# Patient Record
Sex: FEMALE | Race: WHITE | NOT HISPANIC OR LATINO | Employment: OTHER | URBAN - METROPOLITAN AREA
[De-identification: names, ages, dates, MRNs, and addresses within clinical notes are randomized per-mention and may not be internally consistent; named-entity substitution may affect disease eponyms.]

---

## 2023-03-21 LAB — SARS-COV-2 RESULT: NOT DETECTED

## 2023-03-22 ENCOUNTER — HOSPITAL ENCOUNTER (INPATIENT)
Dept: DATA CONVERSION | Facility: HOSPITAL | Age: 63
Discharge: HOME | End: 2023-03-24
Attending: THORACIC SURGERY (CARDIOTHORACIC VASCULAR SURGERY) | Admitting: THORACIC SURGERY (CARDIOTHORACIC VASCULAR SURGERY)
Payer: COMMERCIAL

## 2023-03-22 DIAGNOSIS — Z79.890 HORMONE REPLACEMENT THERAPY: ICD-10-CM

## 2023-03-22 DIAGNOSIS — Z87.891 PERSONAL HISTORY OF NICOTINE DEPENDENCE: ICD-10-CM

## 2023-03-22 DIAGNOSIS — E78.5 HYPERLIPIDEMIA, UNSPECIFIED: ICD-10-CM

## 2023-03-22 DIAGNOSIS — F32.A DEPRESSION, UNSPECIFIED: ICD-10-CM

## 2023-03-22 DIAGNOSIS — E03.9 HYPOTHYROIDISM, UNSPECIFIED: ICD-10-CM

## 2023-03-22 DIAGNOSIS — R91.1 SOLITARY PULMONARY NODULE: ICD-10-CM

## 2023-03-22 DIAGNOSIS — G89.4 CHRONIC PAIN SYNDROME: ICD-10-CM

## 2023-03-22 DIAGNOSIS — E87.6 HYPOKALEMIA: ICD-10-CM

## 2023-03-22 DIAGNOSIS — J98.11 ATELECTASIS: ICD-10-CM

## 2023-03-22 DIAGNOSIS — F41.9 ANXIETY DISORDER, UNSPECIFIED: ICD-10-CM

## 2023-03-22 DIAGNOSIS — C34.11 MALIGNANT NEOPLASM OF UPPER LOBE, RIGHT BRONCHUS OR LUNG (MULTI): ICD-10-CM

## 2023-03-22 DIAGNOSIS — Z98.1 ARTHRODESIS STATUS: ICD-10-CM

## 2023-03-22 DIAGNOSIS — E83.42 HYPOMAGNESEMIA: ICD-10-CM

## 2023-03-23 LAB
ACTIVATED PARTIAL THROMBOPLASTIN TIME IN PPP BY COAGULATION ASSAY: 26 SEC (ref 26–39)
ANION GAP IN SER/PLAS: 14 MMOL/L (ref 10–20)
CALCIUM (MG/DL) IN SER/PLAS: 8.8 MG/DL (ref 8.6–10.6)
CARBON DIOXIDE, TOTAL (MMOL/L) IN SER/PLAS: 24 MMOL/L (ref 21–32)
CHLORIDE (MMOL/L) IN SER/PLAS: 102 MMOL/L (ref 98–107)
CREATININE (MG/DL) IN SER/PLAS: 0.67 MG/DL (ref 0.5–1.05)
ERYTHROCYTE DISTRIBUTION WIDTH (RATIO) BY AUTOMATED COUNT: 12.5 % (ref 11.5–14.5)
ERYTHROCYTE DISTRIBUTION WIDTH (RATIO) BY AUTOMATED COUNT: NORMAL
ERYTHROCYTE MEAN CORPUSCULAR HEMOGLOBIN CONCENTRATION (G/DL) BY AUTOMATED: 31.6 G/DL (ref 32–36)
ERYTHROCYTE MEAN CORPUSCULAR HEMOGLOBIN CONCENTRATION (G/DL) BY AUTOMATED: NORMAL
ERYTHROCYTE MEAN CORPUSCULAR VOLUME (FL) BY AUTOMATED COUNT: 111 FL (ref 80–100)
ERYTHROCYTE MEAN CORPUSCULAR VOLUME (FL) BY AUTOMATED COUNT: NORMAL
ERYTHROCYTES (10*6/UL) IN BLOOD BY AUTOMATED COUNT: 3.87 X10E12/L (ref 4–5.2)
ERYTHROCYTES (10*6/UL) IN BLOOD BY AUTOMATED COUNT: NORMAL
GFR FEMALE: >90 ML/MIN/1.73M2
GLUCOSE (MG/DL) IN SER/PLAS: 150 MG/DL (ref 74–99)
HEMATOCRIT (%) IN BLOOD BY AUTOMATED COUNT: 43.1 % (ref 36–46)
HEMATOCRIT (%) IN BLOOD BY AUTOMATED COUNT: NORMAL
HEMOGLOBIN (G/DL) IN BLOOD: 13.6 G/DL (ref 12–16)
HEMOGLOBIN (G/DL) IN BLOOD: NORMAL
INR IN PPP BY COAGULATION ASSAY: 1 (ref 0.9–1.1)
LEUKOCYTES (10*3/UL) IN BLOOD BY AUTOMATED COUNT: 14.4 X10E9/L (ref 4.4–11.3)
LEUKOCYTES (10*3/UL) IN BLOOD BY AUTOMATED COUNT: NORMAL
MAGNESIUM (MG/DL) IN SER/PLAS: 2.26 MG/DL (ref 1.6–2.4)
NRBC (PER 100 WBCS) BY AUTOMATED COUNT: 0 /100 WBC (ref 0–0)
NRBC (PER 100 WBCS) BY AUTOMATED COUNT: NORMAL
PLATELETS (10*3/UL) IN BLOOD AUTOMATED COUNT: 302 X10E9/L (ref 150–450)
PLATELETS (10*3/UL) IN BLOOD AUTOMATED COUNT: NORMAL
POTASSIUM (MMOL/L) IN SER/PLAS: 4.5 MMOL/L (ref 3.5–5.3)
PROTHROMBIN TIME (PT) IN PPP BY COAGULATION ASSAY: 11.1 SEC (ref 9.8–13.4)
SODIUM (MMOL/L) IN SER/PLAS: 135 MMOL/L (ref 136–145)
UREA NITROGEN (MG/DL) IN SER/PLAS: 13 MG/DL (ref 6–23)

## 2023-03-24 LAB
ACTIVATED PARTIAL THROMBOPLASTIN TIME IN PPP BY COAGULATION ASSAY: 28 SEC (ref 26–39)
ANION GAP IN SER/PLAS: 13 MMOL/L (ref 10–20)
CALCIUM (MG/DL) IN SER/PLAS: 8.9 MG/DL (ref 8.6–10.6)
CARBON DIOXIDE, TOTAL (MMOL/L) IN SER/PLAS: 24 MMOL/L (ref 21–32)
CHLORIDE (MMOL/L) IN SER/PLAS: 100 MMOL/L (ref 98–107)
CREATININE (MG/DL) IN SER/PLAS: 0.62 MG/DL (ref 0.5–1.05)
ERYTHROCYTE DISTRIBUTION WIDTH (RATIO) BY AUTOMATED COUNT: 12.2 % (ref 11.5–14.5)
ERYTHROCYTE MEAN CORPUSCULAR HEMOGLOBIN CONCENTRATION (G/DL) BY AUTOMATED: 31.9 G/DL (ref 32–36)
ERYTHROCYTE MEAN CORPUSCULAR VOLUME (FL) BY AUTOMATED COUNT: 106 FL (ref 80–100)
ERYTHROCYTES (10*6/UL) IN BLOOD BY AUTOMATED COUNT: 3.62 X10E12/L (ref 4–5.2)
GFR FEMALE: >90 ML/MIN/1.73M2
GLUCOSE (MG/DL) IN SER/PLAS: 97 MG/DL (ref 74–99)
HEMATOCRIT (%) IN BLOOD BY AUTOMATED COUNT: 38.2 % (ref 36–46)
HEMOGLOBIN (G/DL) IN BLOOD: 12.2 G/DL (ref 12–16)
INR IN PPP BY COAGULATION ASSAY: 1 (ref 0.9–1.1)
LEUKOCYTES (10*3/UL) IN BLOOD BY AUTOMATED COUNT: 9.6 X10E9/L (ref 4.4–11.3)
MAGNESIUM (MG/DL) IN SER/PLAS: 2.35 MG/DL (ref 1.6–2.4)
NRBC (PER 100 WBCS) BY AUTOMATED COUNT: 0 /100 WBC (ref 0–0)
PLATELETS (10*3/UL) IN BLOOD AUTOMATED COUNT: 261 X10E9/L (ref 150–450)
POTASSIUM (MMOL/L) IN SER/PLAS: 4 MMOL/L (ref 3.5–5.3)
PROTHROMBIN TIME (PT) IN PPP BY COAGULATION ASSAY: 11.1 SEC (ref 9.8–13.4)
SODIUM (MMOL/L) IN SER/PLAS: 133 MMOL/L (ref 136–145)
UREA NITROGEN (MG/DL) IN SER/PLAS: 20 MG/DL (ref 6–23)

## 2023-04-20 LAB
COMPLETE PATHOLOGY REPORT: NORMAL
CONVERTED CLINICAL DIAGNOSIS-HISTORY: NORMAL
CONVERTED FINAL DIAGNOSIS: NORMAL
CONVERTED FINAL REPORT PDF LINK TO COPY AND PASTE: NORMAL
CONVERTED GROSS DESCRIPTION: NORMAL
CONVERTED INTRAOPERATIVE DIAGNOSIS: NORMAL
CONVERTED SYNOPTIC DIAGNOSIS: NORMAL

## 2023-09-04 PROBLEM — E03.9 HYPOTHYROIDISM: Status: ACTIVE | Noted: 2018-10-02

## 2023-09-04 PROBLEM — D49.4 BLADDER TUMOR: Status: ACTIVE | Noted: 2021-07-15

## 2023-09-04 PROBLEM — R73.01 IMPAIRED FASTING GLUCOSE: Status: ACTIVE | Noted: 2021-07-13

## 2023-09-04 PROBLEM — K21.9 ACID REFLUX: Status: ACTIVE | Noted: 2018-10-02

## 2023-09-04 PROBLEM — E78.2 HYPERLIPEMIA, MIXED: Status: ACTIVE | Noted: 2018-10-02

## 2023-09-04 PROBLEM — F32.A DEPRESSIVE DISORDER: Status: ACTIVE | Noted: 2018-10-02

## 2023-09-04 PROBLEM — G62.9 NEUROPATHY: Status: ACTIVE | Noted: 2021-10-19

## 2023-09-04 PROBLEM — E55.9 VITAMIN D DEFICIENCY: Status: ACTIVE | Noted: 2021-10-19

## 2023-09-04 PROBLEM — E78.00 ELEVATED CHOLESTEROL: Status: ACTIVE | Noted: 2018-10-02

## 2023-09-04 PROBLEM — F41.9 ANXIETY: Status: ACTIVE | Noted: 2018-10-02

## 2023-09-04 PROBLEM — R73.01 ELEVATED FASTING BLOOD SUGAR: Status: ACTIVE | Noted: 2021-02-11

## 2023-09-04 PROBLEM — S12.9XXA CERVICAL SPINE FRACTURE (MULTI): Status: ACTIVE | Noted: 2022-07-27

## 2023-09-04 PROBLEM — E53.8 B12 DEFICIENCY: Status: ACTIVE | Noted: 2018-10-02

## 2023-09-04 PROBLEM — R79.89 ELEVATED LIVER FUNCTION TESTS: Status: ACTIVE | Noted: 2021-07-13

## 2023-09-04 PROBLEM — R91.8 LUNG NODULES: Status: ACTIVE | Noted: 2022-12-29

## 2023-09-04 RX ORDER — CHOLECALCIFEROL (VITAMIN D3) 1250 MCG
TABLET ORAL
COMMUNITY
Start: 2022-11-02 | End: 2023-10-27 | Stop reason: ALTCHOICE

## 2023-09-04 RX ORDER — ATORVASTATIN CALCIUM 20 MG/1
20 TABLET, FILM COATED ORAL
COMMUNITY
Start: 2022-12-22

## 2023-09-04 RX ORDER — FOLIC ACID 1 MG/1
1 TABLET ORAL DAILY
COMMUNITY
End: 2023-10-27 | Stop reason: ALTCHOICE

## 2023-09-04 RX ORDER — CHLORHEXIDINE GLUCONATE ORAL RINSE 1.2 MG/ML
15 SOLUTION DENTAL AS NEEDED
COMMUNITY
End: 2023-10-27 | Stop reason: ALTCHOICE

## 2023-09-04 RX ORDER — LANOLIN ALCOHOL/MO/W.PET/CERES
1 CREAM (GRAM) TOPICAL DAILY
COMMUNITY
End: 2023-10-27 | Stop reason: ALTCHOICE

## 2023-09-04 RX ORDER — FLUTICASONE PROPIONATE 50 MCG
1 SPRAY, SUSPENSION (ML) NASAL DAILY
COMMUNITY

## 2023-09-04 RX ORDER — CHLORHEXIDINE GLUCONATE 40 MG/ML
SOLUTION TOPICAL DAILY PRN
COMMUNITY
End: 2023-10-27 | Stop reason: ALTCHOICE

## 2023-09-04 RX ORDER — LEVOTHYROXINE SODIUM 50 UG/1
50 TABLET ORAL
COMMUNITY
Start: 2022-12-22

## 2023-09-04 RX ORDER — METHOCARBAMOL 500 MG/1
500 TABLET, FILM COATED ORAL EVERY 6 HOURS
COMMUNITY
End: 2023-10-27 | Stop reason: ALTCHOICE

## 2023-09-04 RX ORDER — DULOXETIN HYDROCHLORIDE 60 MG/1
1 CAPSULE, DELAYED RELEASE ORAL DAILY
COMMUNITY
End: 2023-10-27 | Stop reason: SDUPTHER

## 2023-09-04 RX ORDER — MULTIVITAMIN
TABLET ORAL
COMMUNITY
End: 2023-10-27

## 2023-09-04 RX ORDER — ATORVASTATIN CALCIUM 10 MG/1
1 TABLET, FILM COATED ORAL DAILY
COMMUNITY
End: 2023-10-27 | Stop reason: DRUGHIGH

## 2023-09-11 ENCOUNTER — HOSPITAL ENCOUNTER (OUTPATIENT)
Dept: DATA CONVERSION | Facility: HOSPITAL | Age: 63
End: 2023-09-11

## 2023-09-11 DIAGNOSIS — R91.1 SOLITARY PULMONARY NODULE: ICD-10-CM

## 2023-09-14 NOTE — PROGRESS NOTES
Service: Thoracic & Esophageal Surgery     Subjective Data:   MARIS REILLY is a 62 year old Female who is Hospital Day # 3 and POD #2 for Flexible bronchoscopy, right robotic assisted upper lobe wedge resection, completion upper lobectomy, mediastinal lymph  node dissection, and rib blocks.    Additional Information:    no acute events overnight  yesterday, flushed with low grade temp, Tylenol given, CIWA initiated. CIWA scores have been 0  RA, voiding, pain controlled on oral regimen  cough productive of blood tinged thick sputum   Continued apical space on am CXR.  CT with forceful tidaling, few bubble evacuation, then no leak. CT clamped at 0730        Objective Data:     Objective Information:      T   P  R  BP   MAP  SpO2   Value  35  67  18  134/72      93%  Date/Time 3/24 5:58 3/24 5:58 3/24 5:58 3/24 5:58    3/24 5:58  Range  (35C - 37.7C )  (64 - 71 )  (18 - 20 )  (109 - 134 )/ (62 - 72 )    (91% - 98% )  Highest temp of 37.7 C was recorded at 3/23 13:40      Pain reported at 3/24 7:48: 2 = Mild    ---- Intake and Output  -----  Mn/Dy/Year Time  Intake   Output  Net  Mar 24, 2023 6:00 am  0   50  -50  Mar 23, 2023 10:00 pm  120   66  54  Mar 23, 2023 2:00 pm  420   1000  -580    The Intake and Output Totals for the last 24 hours are:      Intake   Output  Net      540   1116  -576    Physical Exam by System:    Constitutional: Seen sitting up in bed. Well developed,  awake/alert/oriented x3, no distress, alert and cooperative   Eyes: clear sclera   ENMT: mucous membranes moist   Head/Neck: Neck supple, trachea midline   Respiratory/Thorax: Breathing comfortably on room  air. R CT to WS, forceful tidaling with evacuation of few bubbles, then no leak appreciated with subsequent valsalva.   Cardiovascular: HRR. NSR per tele.   Gastrointestinal: Abdomen soft, NT, ND.   Genitourinary: voiding per bathroom   Extremities: normal extremities, no cyanosis edema,  contusions or wounds, no clubbing  CHUNG x 4.  No LE edema.   Neurological: alert and oriented x3, grossly intact   Psychological: Appropriate mood and behavior   Skin: Warm and dry, no lesions, no rashes appreciated     Medication:    Medications:          Continuous Medications       --------------------------------    1. Sodium Chloride 0.9% Infusion:  1000  mL  IntraVenous  <Continuous>         Scheduled Medications       --------------------------------    1. Atorvastatin:  20  mg  Oral  Daily    2. Docusate:  100  mg  Oral  3 Times a Day    3. DULoxetine:  60  mg  Oral  Daily    4. Folic Acid:  1  mg  Oral  Daily    5. Heparin SubCutaneous:  5000  unit(s)  SubCutaneous  Every 8 Hours    6. Ketorolac Injectable:  15  mg  IntraVenous Push  Every 6 Hours    7. Levothyroxine:  50  microgram(s)  Oral  Daily    8. Metoprolol Tartrate:  12.5  mg  Oral  Every 8 Hours    9. Multivitamin with Minerals:  1  tablet(s)  Oral  Daily    10. Thiamine:  100  mg  Oral  Daily         PRN Medications       --------------------------------    1. Acetaminophen:  650  mg  Oral  Every 4 Hours    2. Albuterol 2.5 mg - Ipratropium 0.5 mg/ 3 mL Neb Soln:  3  mL  Inhalation  Every 6 Hours    3. diazePAM (VALIUM):  10  mg  Oral  Every 2 Hours    4. Naloxone Injectable:  0.2  mg  IntraVenous Push  Once    5. Ondansetron Injectable:  4  mg  IntraVenous Push  Every 6 Hours    6. oxyCODONE Immediate Release:  5  mg  Oral  Every 4 Hours    7. oxyCODONE Immediate Release:  10  mg  Oral  Every 4 Hours    8. Sodium Chloride 0.9% Injectable Flush:  10  mL  IntraVenous Flush  Every 8 Hours and as Needed        Recent Lab Results:    Results:    CBC: 3/24/2023 07:10              \     Hgb     /                              \     12.2       /  WBC  ----------------  Plt               9.6       ----------------    261              /     Hct     \                              /     38.2       \            RBC: 3.62 L    MCV: 106 H        Radiology Results:    Results:        Impression:    1.  Interval development of a small right apical pneumothorax  2. Partially resolving right middle lung opacity, postsurgical  atelectasis or hemorrhage.  3. Partially resolving left basilar atelectasis.  4. Medical devices and postsurgical changes as above.     Critical findings were communicated by phone to Dr. Hernandes by Dr. Narinder Jensen MD (resident) on 3/23/2023 at approximately  06:24 a.m..      Xray Chest 1 View [Mar 23 2023  8:08AM]      Impression:    1.  Status post right upper lobectomy with significantly decreased  lung volumes bilaterally. No pneumothorax.  2. Ill-defined patchy opacity within the right mid lung may represent  pulmonary contusion or less likely postsurgical atelectasis and/or  hemorrhage; attention on follow-up is recommended.  3. Left basilar opacity likely representing atelectasis.  4. Medical devices and postsurgical changes as above.      Xray Chest 1 View [Mar 23 2023  8:05AM]      Impression:     [Interval development of a right apical pneumothorax    Left basilar atelectasis.    Medical devices and postsurgical changes as above.] []     Critical findings were communicated by phone to [Dr. Hernandes] by Dr. Narinder Jensen MD (resident) on [3/23/2023] at approximately [06:24 a.m.].           UNSIGNED REPOR Xray Chest 1 View [Mar 23 2023  6:43AM]      Impression:     [Status post right upper lobectomy with significantly decreased lung volumes bilaterally.] No pneumothorax.     Ill-defined patchy opacity within the right mid lung may represent pulmonary contusion or less likely developing infectious process; attention on follow-up is recommended.    [Left basilar opacity likely representing atelectasis.]    Medical devices and postsurgical changes as above.      [ Xray Chest 1 View [Mar 22 2023  5:20PM]      Assessment and Plan:   Code Status:  ·  Code Status Full Code     Assessment:    Ms. Sandy Mosher is a 62 year old female status post R robotic assisted Upper lobectomy  on 3/22/2023. Satisfactory post op course.     Neurology: post operative pain, hx cervical and thoracic spine fusion, hx depression, hx ETOH daily 2-3 beer/wine beverages  -continue oral regimen of pain control with oxycodone and Tylenol   -Bowel regimen available for constipation secondary to pain medication   -Out of bed to the chair throughout the day  -Encourage ambulation as tolerated  - continue home duloxetine  - CIWA protocol     Cardiovascular:   -Continue telemetry  -Vital signs every four hours  -Continue metoprolol 25mg BID for post operative arrhythmia prophylaxis    -Replace electrolytes as needed for K >4, Mg >2    Pulmonology: post op atelectasis, chest tube management, hx RUL nodule, 30 ppy smoker--recently quit   -Encourage incentive spirometer use every hour  -Continue pulmonary hygiene  -Wean oxygen as tolerated   -CT clamped at 0730, CXR at 1130, +/- removal  -Obtain daily CXR  -Monitor and record chest tube drainage every shift    Gastrointestinal: hx hyperlipidemia   - Regular diet as tolerated  - Zofran available for nausea  - scheduled colace, PRN bowel regimen  - continue home atorvastatin     Genitourinary:   -Spontaneously voiding   -Continue to monitor daily electrolytes with routine BMPs   -Adequate urine output    Infectious Disease:   -Continue to trend daily temperatures and WBC count to monitor for signs of post-operative infection   -Monitor surgical incisions for signs of infection   -Perioperative antibiotics completed     Hematology:   -Monitor for signs of acute blood loss  -Trend CBCs     Endocrine: hx hypothyroidism   - home levothyroxine    DVT Prophylaxis:   -Continue subcutaneous heparin and SCDs, early ambulation    Disposition:  -Plan for discharge to home once stable from a med-surg standpoint, as early as today if CT can be removed after clamp trial.     Patient seen and examined by this provider. Plan of care discussed with Ulises Kim and Nadira Acosta,  CNP  Thoracic team pager [phone].    Attestation:   Note Completion:  Provider/Team Pager # [phone]         Electronic Signatures:  Natividad Acosta (APRN-CNP)  (Signed 24-Mar-2023 08:02)   Authored: Service, Subjective Data, Objective Data, Assessment  and Plan, Note Completion      Last Updated: 24-Mar-2023 08:02 by Natividad Acosta (APRN-CNP)

## 2023-09-14 NOTE — PROGRESS NOTES
Service: Thoracic & Esophageal Surgery     Subjective Data:   MARIS REILLY is a 62 year old Female who is Hospital Day # 2 and POD #1 for Flexible bronchoscopy, right robotic assisted upper lobe wedge resection, completion upper lobectomy, mediastinal lymph  node dissection, and rib blocks.    Additional Information:    no acute events overnight  voiding  weaned to room air  CT with forceful tidaling, few bubble evacuation, then no leak  apical space on am CXR     Objective Data:     Objective Information:      T   P  R  BP   MAP  SpO2   Value  36.9  68  18  109/62      92%  Date/Time 3/23 10:05 3/23 10:05 3/23 10:05 3/23 10:05    3/23 10:05  Range  (35.4C - 36.9C )  (64 - 74 )  (18 - 18 )  (109 - 131 )/ (62 - 87 )    (92% - 98% )   As of 22-Mar-2023 20:51:00, patient is on 2 L/min of oxygen via nasal cannula.  Highest temp of 36.9 C was recorded at 3/23 10:05      Pain reported at 3/23 10:50: 6 = Moderate    ---- Intake and Output  -----  Mn/Dy/Year Time  Intake   Output  Net  Mar 23, 2023 6:00 am  0   85  -85  Mar 22, 2023 10:00 pm  820   360  460    The Intake and Output Totals for the last 24 hours are:      Intake   Output  Net      820   445  375    Physical Exam by System     Constitutional: Seen sitting up in bed. Well developed,  awake/alert/oriented x3, no distress, alert and cooperative   Eyes: clear sclera   ENMT: mucous membranes moist   Head/Neck: Neck supple, trachea midline   Respiratory/Thorax: Breathing comfortably on room  air. R CT to WS, forceful tidaling with evacuation of few bubbles, then no leak appreciated with subsequent valsalva.   Cardiovascular: HRR. NSR per tele.   Gastrointestinal: Abdomen soft, NT, ND.   Genitourinary: voiding per bathroom   Extremities: normal extremities, no cyanosis edema,  contusions or wounds, no clubbing  CHUNG x 4. No LE edema.   Neurological: alert and oriented x3, grossly intact   Psychological: Appropriate mood and behavior   Skin: Warm and dry, no  lesions, no rashes appreciated     Medication     Medications:          Continuous Medications       --------------------------------    1. Sodium Chloride 0.9% Infusion:  1000  mL  IntraVenous  <Continuous>         Scheduled Medications       --------------------------------    1. Atorvastatin:  20  mg  Oral  Daily    2. Docusate:  100  mg  Oral  3 Times a Day    3. DULoxetine:  60  mg  Oral  Daily    4. Heparin SubCutaneous:  5000  unit(s)  SubCutaneous  Every 8 Hours    5. Levothyroxine:  50  microgram(s)  Oral  Daily    6. Metoprolol Tartrate:  12.5  mg  Oral  Every 8 Hours         PRN Medications       --------------------------------    1. Acetaminophen:  650  mg  Oral  Every 4 Hours    2. Albuterol 2.5 mg - Ipratropium 0.5 mg/ 3 mL Neb Soln:  3  mL  Inhalation  Every 6 Hours    3. Naloxone Injectable:  0.2  mg  IntraVenous Push  Once    4. Ondansetron Injectable:  4  mg  IntraVenous Push  Every 6 Hours    5. oxyCODONE Immediate Release:  5  mg  Oral  Every 4 Hours    6. oxyCODONE Immediate Release:  10  mg  Oral  Every 4 Hours    7. Sodium Chloride 0.9% Injectable Flush:  10  mL  IntraVenous Flush  Every 8 Hours and as Needed    8. Sore Throat Lozenge:  1  lozenge(s)  Oral  Every 2 Hours        Recent Lab Results     Results:    CBC: 3/23/2023 08:46              \     Hgb     /                              \     Canceled       /  WBC  ----------------  Plt               Canceled       ----------------    Canceled              /     Hct     \                              /     Canceled       \            RBC: Canceled     MCV: Canceled           BMP: 3/23/2023 07:26  NA+        Cl-     BUN  /                         135 L   102    13  /  --------------------------------  Glucose                ---------------------------  150 H    K+     HCO3-   Creat \                         4.5  24    0.67  \  Calcium : 8.8     Anion Gap : 14      Coagulation: 3/23/2023 07:26  PT  /                    11.1   /  -------<    INR          ----------<      1.0  PTT\                    26  \                       Radiology Results     Results:        Impression:    1. Interval development of a small right apical pneumothorax  2. Partially resolving right middle lung opacity, postsurgical  atelectasis or hemorrhage.  3. Partially resolving left basilar atelectasis.  4. Medical devices and postsurgical changes as above.     Critical findings were communicated by phone to Dr. Hernandes by Dr. Narinder Jensen MD (resident) on 3/23/2023 at approximately  06:24 a.m..      Xray Chest 1 View [Mar 23 2023  8:08AM]      Impression:    1.  Status post right upper lobectomy with significantly decreased  lung volumes bilaterally. No pneumothorax.  2. Ill-defined patchy opacity within the right mid lung may represent  pulmonary contusion or less likely postsurgical atelectasis and/or  hemorrhage; attention on follow-up is recommended.  3. Left basilar opacity likely representing atelectasis.  4. Medical devices and postsurgical changes as above.      Xray Chest 1 View [Mar 23 2023  8:05AM]      Impression:     [Interval development of a right apical pneumothorax    Left basilar atelectasis.    Medical devices and postsurgical changes as above.] []     Critical findings were communicated by phone to [Dr. Hernandes] by Dr. Narinder Jensen MD (resident) on [3/23/2023] at approximately [06:24 a.m.].           UNSIGNED REPOR Xray Chest 1 View [Mar 23 2023  6:43AM]      Impression:     [Status post right upper lobectomy with significantly decreased lung volumes bilaterally.] No pneumothorax.     Ill-defined patchy opacity within the right mid lung may represent pulmonary contusion or less likely developing infectious process; attention on follow-up is recommended.    [Left basilar opacity likely representing atelectasis.]    Medical devices and postsurgical changes as above.      [ Xray Chest 1 View [Mar 22 2023  5:20PM]      Assessment  and Plan:        Medical History:   Lung cancer: Entered Date: 22-Mar-2023 16:32    Code Status   ·  Code Status Full Code     Assessment:    Ms. Sandy Mosher is a 62 year old female status post R robotic assisted Upper lobectomy on 3/22/2023. Satisfactory post op course.     Neurology: post operative pain, hx cervical and thoracic spine fusion, hx depression  -Discontinue PCA pump  -Begin oral regimen of pain control with oxycodone and Tylenol   -Bowel regimen available for constipation secondary to pain medication   -Out of bed to the chair throughout the day  -Encourage ambulation as tolerated  - continue home duloxetine    Cardiovascular:   -Continue telemetry  -Vital signs every four hours  -Continue metoprolol 25mg BID for post operative arrhythmia prophylaxis   -Continue home regimen of    -Replace electrolytes as needed for K >4, Mg >2    Pulmonology: post op atelectasis, chest tube management, hx RUL nodule, 30 ppy smoker--recently quit   -Encourage incentive spirometer use every hour  -Continue pulmonary hygiene  -Wean oxygen as tolerated   -Maintain chest tube to WS  -Obtain daily CXR  -Monitor and record chest tube drainage every shift    Gastrointestinal: hx hyperlipidemia   - Regular diet as tolerated  - Zofran available for nausea  - scheduled colace, PRN bowel regimen  - continue home atorvastatin     Genitourinary:   -Removed pierre  -Spontaneously voiding   -Continue to monitor daily electrolytes with routine BMPs   -Adequate urine output    Infectious Disease:   -Continue to trend daily temperatures and WBC count to monitor for signs of post-operative infection   -Monitor surgical incisions for signs of infection   -Perioperative antibiotics completed     Hematology:   -Monitor for signs of acute blood loss  -Trend CBCs     Endocrine: hx hypothyroidism   - home levothyroxine    DVT Prophylaxis:   -Continue subcutaneous heparin and SCDs, early ambulation    Disposition:  -Plan for discharge to home  once stable from a med-surg standpoint   -Assess for home needs     Patient seen and examined by this provider. Plan of care discussed with Dr. Julio Acosta CNP  Thoracic team pager [phone].    Attestation:   Note Completion   Provider/Team Pager # [phone]       Electronic Signatures for Addendum Section:   Natividad Acosta (APRN-CNP) (Signed Addendum 23-Mar-2023 14:23)   Febrile to 38 and flushed, tylenol administered  patient admits to drinking 2-3 beer or wine drinks daily  last drink days before surgery  CIWA protocol ordered in EMR     Electronic Signatures:  Natividad Acosta (APRN-CNP)  (Signed 23-Mar-2023 11:17)   Authored: Service, Subjective Data, Objective Data, Assessment  and Plan, Note Completion      Last Updated: 23-Mar-2023 14:23 by Natividad Acosta (APRN-CNP)

## 2023-09-14 NOTE — DISCHARGE SUMMARY
Send Summary:   Discharge Summary Providers:  Provider Role Provider Name   · Attending Sahil Galindo   · Nurse  Practitioner Real Cage   · Primary Adwoa Miranda       Note Recipients: BrigetteAdwoa olivera, [facility] - [phone]  []       Discharge:    Summary:   Admission Date: .22-Mar-2023 09:44:00   Discharge Date: 24-Mar-2023   Attending Physician at Discharge: Sahil Galindo   Admission Reason: lung cancer   Final Discharge Diagnoses: Lung cancer   Procedures: Date: 22-Mar-2023 16:47:00  Procedure Name: Flexible bronchoscopy, right robotic assisted upper lobe wedge resection, completion upper lobectomy, mediastinal lymph node dissection, and rib blocks   Condition at Discharge: Satisfactory   Disposition at Discharge: .Home   Vital Signs:        T   P  R  BP   MAP  SpO2   Value  36.5  69  16  146/79   102  96%  Date/Time 3/24 10:00 3/24 10:00 3/24 10:00 3/24 10:00  3/24 10:00 3/24 10:00  Range  (35C - 37.7C )  (64 - 71 )  (16 - 20 )  (109 - 146 )/ (62 - 79 )  (102 - 102 )  (91% - 98% )   As of 24-Mar-2023 10:00:00, patient is on 2 L/min of oxygen via nasal cannula.  Highest temp of 37.7 C was recorded at 3/23 13:40    Date:            Weight/Scale Type:  Height:   24-Mar-2023 02:55  68.3  kg              Hospital Course:    Ms. Sandy Mosher is a 62 year old female status post R robotic assisted Upper lobectomy on 3/22/2023. Satisfactory post op course.  Chest tube successfully removed  on 3/24/23 after a negative clamp trial with stable post removal CXR.  Supplemental oxygen was weaned off & was oxygenating 92% on room air the day of discharge. Pain was well managed with oral pain regimen. She was deemed stable for discharge on 3/24/23 and was given the usual thoracic discharge instructions. She was advised  to f/u with Dr. Kim in 2 weeks with CXR.     Hospital day of discharge management - spent >30 minutes coordinating the discharge and counseling/educating patient and family  regarding discharge instructions.        Discharge Information:    and Continuing Care:   Lab Results - Pending:    Surgical Pathology Drawn at 22-Mar-2023 15:54:00  Surgical Pathology Drawn at 22-Mar-2023 14:49:00  Surgical Pathology Drawn at 22-Mar-2023 14:30:00  Radiology Results - Pending: Xray Chest 1 View at  24-Mar-2023 13:05:00  Xray Chest 1 View at 24-Mar-2023 11:23:00  Xray Chest 1 View at 24-Mar-2023 04:19:00   Discharge Instructions:    Activity:           Activity as tolerated          Continue increasing activity and using incentive spirometer, cough and deep breathe          No flying for 3 weeks          No jogging or heavy aerobics for 3 weeks          No driving while on pain medications (narcotics)          No pushing, pulling or lifting objects over 15 lbs for 3 weeks    Nutrition/Diet:           Regular    Wound Care:           Maintain occlusive dressing intact on chest tube sites for 48 hours after last chest tube removal, then remove. Apply dry band-aid if any oozing          May shower 48 hours after last chest tube removal          No swimming or bath tub until incisions well healed, 1 week          Cleanse incisions with soap and water daily. No dressing required; leave open to air. Do not use lotions, creams or tub soaks          Notify thoracic surgery of redness, increased drainage, bleeding or other problems    Infectious Disease:           PPD Status:   not given          MRSA:   no          VRE:   no          C. Diff:   no          Other Resistant Organism:   no          Isolation Type:   none    Follow Up Appointments:    Follow-Up - Chest Xray:           Physician/Dept/Service:   Chest Xray          Scheduled Date/Time:   11-Apr-2023 13:00          Location:   [place] [address] Please check-in at the [facility] ..    Follow-Up - Primary Care Physician:           Physician/Dept/Service:   Primary Care Physician    Follow-Up  Appointment:           Physician/Dept/Service:   Thoracic Surgery/          Scheduled Date/Time:   11-Apr-2023 13:45          Location:   [place] [address] Please check-in at the [facility] ..          Phone Number:   [phone].    Discharge Medications: Home Medication   levothyroxine 50 mcg (0.05 mg) oral tablet - 1 tab(s) orally once a day  cyanocobalamin 500 mcg oral tablet - 1 tab(s) orally once a day  cholecalciferol 25 mcg (1000 intl units) oral tablet - 1 tab(s) orally once a day  atorvastatin 20 mg oral tablet - 1 tab(s) oral once a day  DULoxetine 60 mg oral delayed release capsule - 1 tab(s) oral once a day     PRN Medication   fluticasone 50 mcg/inh nasal spray - 1 spray(s) nasal once a day, As Needed  acetaminophen 325 mg oral tablet - 2 tab(s) orally every 4 hours, As needed, Pain - Mild (1-3) or fever  oxyCODONE 5 mg oral tablet - 1 tab(s) orally every 6 hours, As Needed -Pain     DNR Status:   ·  Code Status Code Status order at time of discharge: Full Code     Attestation:   Note Completion:  Provider/Team Pager # [phone]         Electronic Signatures:  Kemi Ha (APRN-CNP)  (Signed 24-Mar-2023 14:17)   Authored: Send Summary, Summary Content, Ongoing Care,  DNR Status, Note Completion      Last Updated: 24-Mar-2023 14:17 by Kemi Ha (APRN-CNP)

## 2023-10-02 NOTE — OP NOTE
Post Operative Note:     PreOp Diagnosis: Lung Nodule   Post-Procedure Diagnosis: Lung Cancer   Procedure: Flexible bronchoscopy, right robotic assisted  upper lobe wedge resection, completion upper lobectomy, mediastinal lymph node dissection, and rib blocks   Surgeon: Dr. Kim   Resident/Fellow/Other Assistant: Jania Redmond PA-C, Ollie Quan MD   Anesthesia: GETA   I.V. Fluids: 700cc crystalloids   Estimated Blood Loss (mL): 50cc   Specimen: yes. Right upper lobe wedge, completion  lobectomy. Mediastinal lymph nodes 4R, 7, 8, 10R   Findings: Frozen pathology of the right upper lung  wedge resection consistent with adenocarcinoma   Patient Returned To/Condition: PACU in stable condition   Drains and/or Catheters: One right chest tube to  -20cm suction     Operative Report Dictated:  Dictation: not applicable - note contains Operative  Report   Operative Report:    After identifying the patient the preoperative area, the patient is brought the operative room.  Patient placed in supine position.  Timeout performed.  General anesthesia  induced with a double-lumen tube.  Flexible bronchoscopy done to assess position of the endotracheal tube and to assess airway which was normal.  Patient placed on the left lateral decubitus with the right side up.  All bony prominences were padded.   Chest was prepped and draped in surgical fashion.  1 cm incision was performed 7 the costal space with mid axillary line.  After getting access to the chest cavity, multilevel rib blocks were done with a mixture of Marcaine under direct visualization.   Additional ports were placed 1 anterior and 2 posterior.  An assist port was placed between the ports 2 and 3.  This point we proceeded to docked the robot.  I proceeded to scrub out and moved to the console.  We started by doing a wedge resection of  the upper lobe nodule with a combination of black loads of the robotic stapler.  This was sent for frozen section and came back  as positive for adenocarcinoma.  So at this point attention was turned to the inferior pulmonary ligament where station 9 lymph  nodes were harvested and sent for permanent section.  This dissection was taken posteriorly into station 7 lymph nodes were harvested and sent for permanent section.  Station 4 lymph nodes were harvested and sent for permanent section.  As well as some  hilar lymph nodes.  Attention was turned to the superior vein which was circumferentially dissected and transected with a white load of the robotic stapler.  The pulmonary artery branch going to the upper lobe was identified circumferentially dissected  and transected with a white load of the robotic stapler.  Finally the bronchus was identified and circumferentially dissected and resected with a black load of the robotic stapler.  Finally showing combination of black loads we proceeded to transect the  rest of the upper lobe to complete the lobectomy.  This was put in the Endo Catch bag and removed and sent for permanent section.  Hemostasis was achieved at all times.  28 Khmer chest tube was placed under direct visualization robotic was undocked.   Lung was insufflated under regularization wounds were closed in layers.  Patient tolerated procedure very well was extubated and transferred to the postanesthesia care unit adequate conditions.  All counts were correct.  Jania Myers PA-C acted as  first assistant in this case.      Attestation:   Note Completion:  Provider/Team Pager # [phone]   I am a: Advanced Practice Provider   Attending Only - Shared Visit with Advanced Practice Provider This is a shared visit.  I have reviewed the Advanced Practice Provider?s encounter note, approve the Advanced Practice Provider?s documentation,  and provide the following additional information from my personal encounter.    Attending Attestation I was present for the entire procedure    Comments/ Additional Findings    .          Electronic  Signatures:  Jania Redmond (PAC)  (Signed 22-Mar-2023 16:53)   Authored: Post Operative Note, Note Completion  Sahil Galindo)  (Signed 22-Mar-2023 20:29)   Authored: Post Operative Note, Note Completion   Co-Signer: Post Operative Note, Note Completion      Last Updated: 22-Mar-2023 20:29 by Sahil Galindo)

## 2023-10-05 ENCOUNTER — HOSPITAL ENCOUNTER (OUTPATIENT)
Dept: RADIOLOGY | Facility: HOSPITAL | Age: 63
Discharge: HOME | End: 2023-10-05
Payer: COMMERCIAL

## 2023-10-05 DIAGNOSIS — R91.1 SOLITARY PULMONARY NODULE: ICD-10-CM

## 2023-10-05 PROCEDURE — 71250 CT THORAX DX C-: CPT

## 2023-10-09 RX ORDER — ASPIRIN 325 MG
50000 TABLET, DELAYED RELEASE (ENTERIC COATED) ORAL
COMMUNITY
Start: 2022-11-07

## 2023-10-09 RX ORDER — OXYCODONE HYDROCHLORIDE 5 MG/1
5 TABLET ORAL
COMMUNITY
Start: 2023-03-24 | End: 2023-10-27 | Stop reason: ALTCHOICE

## 2023-10-09 RX ORDER — BUPROPION HYDROCHLORIDE 150 MG/1
150 TABLET, EXTENDED RELEASE ORAL 2 TIMES DAILY
COMMUNITY
Start: 2022-10-20 | End: 2023-10-27

## 2023-10-10 ENCOUNTER — OFFICE VISIT (OUTPATIENT)
Dept: CARDIAC SURGERY | Facility: CLINIC | Age: 63
End: 2023-10-10
Payer: COMMERCIAL

## 2023-10-10 VITALS
RESPIRATION RATE: 18 BRPM | OXYGEN SATURATION: 98 % | DIASTOLIC BLOOD PRESSURE: 80 MMHG | WEIGHT: 159 LBS | HEIGHT: 63 IN | HEART RATE: 90 BPM | SYSTOLIC BLOOD PRESSURE: 145 MMHG | BODY MASS INDEX: 28.17 KG/M2

## 2023-10-10 DIAGNOSIS — R91.1 LUNG NODULE: Primary | ICD-10-CM

## 2023-10-10 DIAGNOSIS — Z09 S/P LUNG SURGERY, FOLLOW-UP EXAM: Primary | ICD-10-CM

## 2023-10-10 PROCEDURE — 99215 OFFICE O/P EST HI 40 MIN: CPT | Performed by: THORACIC SURGERY (CARDIOTHORACIC VASCULAR SURGERY)

## 2023-10-10 ASSESSMENT — ENCOUNTER SYMPTOMS
ENDOCRINE NEGATIVE: 1
FEVER: 0
CONSTIPATION: 0
PSYCHIATRIC NEGATIVE: 1
LOSS OF SENSATION IN FEET: 0
STRIDOR: 0
NEUROLOGICAL NEGATIVE: 1
CHEST TIGHTNESS: 0
APPETITE CHANGE: 0
CHILLS: 0
CHOKING: 0
DIAPHORESIS: 0
DIARRHEA: 0
FATIGUE: 0
DEPRESSION: 0
HEMATOLOGIC/LYMPHATIC NEGATIVE: 1
COUGH: 0
ALLERGIC/IMMUNOLOGIC NEGATIVE: 1
OCCASIONAL FEELINGS OF UNSTEADINESS: 0
ABDOMINAL DISTENTION: 0
SHORTNESS OF BREATH: 0
UNEXPECTED WEIGHT CHANGE: 0
NAUSEA: 0
WHEEZING: 0
PALPITATIONS: 0
MUSCULOSKELETAL NEGATIVE: 1
EYES NEGATIVE: 1
VOMITING: 0
ABDOMINAL PAIN: 0

## 2023-10-10 ASSESSMENT — LIFESTYLE VARIABLES
AUDIT-C TOTAL SCORE: 3
AUDIT TOTAL SCORE: 3
SKIP TO QUESTIONS 9-10: 1
HOW OFTEN DO YOU HAVE SIX OR MORE DRINKS ON ONE OCCASION: NEVER
HAS A RELATIVE, FRIEND, DOCTOR, OR ANOTHER HEALTH PROFESSIONAL EXPRESSED CONCERN ABOUT YOUR DRINKING OR SUGGESTED YOU CUT DOWN: NO
HOW MANY STANDARD DRINKS CONTAINING ALCOHOL DO YOU HAVE ON A TYPICAL DAY: 1 OR 2
HOW OFTEN DO YOU HAVE A DRINK CONTAINING ALCOHOL: 2-3 TIMES A WEEK
HAVE YOU OR SOMEONE ELSE BEEN INJURED AS A RESULT OF YOUR DRINKING: NO

## 2023-10-10 ASSESSMENT — PATIENT HEALTH QUESTIONNAIRE - PHQ9
2. FEELING DOWN, DEPRESSED OR HOPELESS: NOT AT ALL
SUM OF ALL RESPONSES TO PHQ9 QUESTIONS 1 & 2: 0
1. LITTLE INTEREST OR PLEASURE IN DOING THINGS: NOT AT ALL

## 2023-10-10 NOTE — PROGRESS NOTES
Subjective   Patient ID: Sandy Mosher is a 62 y.o. female who presents for Follow-up (6 month follow up with CT).  HPI  60-year-old female who last March underwent a robotic assisted upper lobe wedge resection completion upper lobectomy and mediastinal lymphadenectomy for lung cancer.  She has recovered very well from it.  Today she is here for follow-up.  I have personally reviewed her CAT scan and there is no evidence of recurrence.  She has recovered very well with minimal discomfort in the right upper quadrant of the abdomen.  I will see again in 6 months with a CT of the chest.  Review of Systems   Constitutional:  Negative for appetite change, chills, diaphoresis, fatigue, fever and unexpected weight change.   HENT: Negative.     Eyes: Negative.    Respiratory:  Negative for cough, choking, chest tightness, shortness of breath, wheezing and stridor.    Cardiovascular:  Negative for chest pain, palpitations and leg swelling.   Gastrointestinal:  Negative for abdominal distention, abdominal pain, constipation, diarrhea, nausea and vomiting.   Endocrine: Negative.    Genitourinary: Negative.    Musculoskeletal: Negative.    Skin: Negative.    Allergic/Immunologic: Negative.    Neurological: Negative.    Hematological: Negative.    Psychiatric/Behavioral: Negative.     All other systems reviewed and are negative.      Objective   Physical Exam  Constitutional:       Appearance: Normal appearance.   HENT:      Head: Normocephalic and atraumatic.      Nose: Nose normal.      Mouth/Throat:      Mouth: Mucous membranes are moist.      Pharynx: Oropharynx is clear.   Eyes:      Extraocular Movements: Extraocular movements intact.      Conjunctiva/sclera: Conjunctivae normal.      Pupils: Pupils are equal, round, and reactive to light.   Cardiovascular:      Rate and Rhythm: Normal rate and regular rhythm.      Pulses: Normal pulses.      Heart sounds: Normal heart sounds.   Pulmonary:      Effort: Pulmonary effort is  normal. No respiratory distress.      Breath sounds: Normal breath sounds. No stridor. No wheezing, rhonchi or rales.   Chest:      Chest wall: No tenderness.   Abdominal:      General: Abdomen is flat. Bowel sounds are normal.      Palpations: Abdomen is soft.   Musculoskeletal:         General: Normal range of motion.      Cervical back: Normal range of motion and neck supple.   Skin:     General: Skin is warm and dry.      Capillary Refill: Capillary refill takes less than 2 seconds.   Neurological:      General: No focal deficit present.      Mental Status: She is alert and oriented to person, place, and time.         Assessment/Plan   Diagnoses and all orders for this visit:  Lung nodule  Follow-up in 6 months with a CT of the chest.    Sahil Kim MD  Thoracic and Esophageal Surgery

## 2023-10-27 ENCOUNTER — OFFICE VISIT (OUTPATIENT)
Dept: PRIMARY CARE | Facility: CLINIC | Age: 63
End: 2023-10-27
Payer: COMMERCIAL

## 2023-10-27 VITALS
HEIGHT: 63 IN | BODY MASS INDEX: 28.35 KG/M2 | DIASTOLIC BLOOD PRESSURE: 94 MMHG | HEART RATE: 96 BPM | WEIGHT: 160 LBS | SYSTOLIC BLOOD PRESSURE: 146 MMHG

## 2023-10-27 DIAGNOSIS — E78.2 HYPERLIPEMIA, MIXED: ICD-10-CM

## 2023-10-27 DIAGNOSIS — Z12.31 ENCOUNTER FOR SCREENING MAMMOGRAM FOR MALIGNANT NEOPLASM OF BREAST: ICD-10-CM

## 2023-10-27 DIAGNOSIS — R73.01 IMPAIRED FASTING GLUCOSE: ICD-10-CM

## 2023-10-27 DIAGNOSIS — F41.9 ANXIETY: ICD-10-CM

## 2023-10-27 DIAGNOSIS — Z78.0 POSTMENOPAUSAL STATUS: ICD-10-CM

## 2023-10-27 DIAGNOSIS — Z23 NEED FOR VIRAL IMMUNIZATION: ICD-10-CM

## 2023-10-27 DIAGNOSIS — Z00.00 GENERAL MEDICAL EXAM: Primary | ICD-10-CM

## 2023-10-27 DIAGNOSIS — R06.02 SHORTNESS OF BREATH: ICD-10-CM

## 2023-10-27 DIAGNOSIS — E03.9 ACQUIRED HYPOTHYROIDISM: ICD-10-CM

## 2023-10-27 DIAGNOSIS — Z01.419 CERVICAL SMEAR, AS PART OF ROUTINE GYNECOLOGICAL EXAMINATION: ICD-10-CM

## 2023-10-27 PROBLEM — Z98.1 ARTHRODESIS STATUS: Status: ACTIVE | Noted: 2022-06-26

## 2023-10-27 PROBLEM — Z91.81 HISTORY OF FALLING: Status: ACTIVE | Noted: 2022-06-26

## 2023-10-27 PROBLEM — E78.5 HYPERLIPIDEMIA, UNSPECIFIED: Status: ACTIVE | Noted: 2022-06-26

## 2023-10-27 PROCEDURE — 88175 CYTOPATH C/V AUTO FLUID REDO: CPT

## 2023-10-27 PROCEDURE — 93000 ELECTROCARDIOGRAM COMPLETE: CPT | Performed by: PHYSICIAN ASSISTANT

## 2023-10-27 PROCEDURE — 99396 PREV VISIT EST AGE 40-64: CPT | Performed by: PHYSICIAN ASSISTANT

## 2023-10-27 PROCEDURE — 90686 IIV4 VACC NO PRSV 0.5 ML IM: CPT | Performed by: PHYSICIAN ASSISTANT

## 2023-10-27 PROCEDURE — 90471 IMMUNIZATION ADMIN: CPT | Performed by: PHYSICIAN ASSISTANT

## 2023-10-27 RX ORDER — DULOXETIN HYDROCHLORIDE 60 MG/1
60 CAPSULE, DELAYED RELEASE ORAL DAILY
Qty: 30 CAPSULE | Refills: 1 | Status: SHIPPED | OUTPATIENT
Start: 2023-10-27 | End: 2024-04-09

## 2023-10-27 ASSESSMENT — PATIENT HEALTH QUESTIONNAIRE - PHQ9
1. LITTLE INTEREST OR PLEASURE IN DOING THINGS: NOT AT ALL
2. FEELING DOWN, DEPRESSED OR HOPELESS: NOT AT ALL
SUM OF ALL RESPONSES TO PHQ9 QUESTIONS 1 AND 2: 0

## 2023-10-27 ASSESSMENT — ENCOUNTER SYMPTOMS: NERVOUS/ANXIOUS: 1

## 2023-10-27 ASSESSMENT — PAIN SCALES - GENERAL: PAINLEVEL: 0-NO PAIN

## 2023-10-27 NOTE — PROGRESS NOTES
"Subjective   Patient ID: Sandy Mosher is a 62 y.o. female who presents for Annual Exam (EKG 2022./Colonoscopy 2018./DEXA due.) and Gynecologic Exam (Last pap 2018 normal & negative).    Hyperlipidemia  This is a chronic problem. The problem is uncontrolled. Exacerbating diseases include hypothyroidism. Associated symptoms include myalgias. Pertinent negatives include no chest pain or shortness of breath. She is currently on no antihyperlipidemic treatment. Compliance problems include adherence to diet and adherence to exercise.    Thyroid Problem  Presents for follow-up visit. Symptoms include anxiety and fatigue. Patient reports no constipation, diarrhea or palpitations. Her past medical history is significant for hyperlipidemia.        Review of Systems   Constitutional:  Positive for fatigue. Negative for activity change, appetite change and fever.   HENT:  Negative for hearing loss and nosebleeds.    Eyes:  Negative for pain.   Respiratory:  Negative for chest tightness and shortness of breath.    Cardiovascular:  Negative for chest pain, palpitations and leg swelling.   Gastrointestinal:  Negative for abdominal pain, constipation and diarrhea.   Genitourinary:  Negative for dysuria and hematuria.   Musculoskeletal:  Positive for arthralgias, back pain, gait problem and myalgias. Negative for joint swelling.   Skin:  Negative for color change.   Neurological:  Positive for numbness. Negative for speech difficulty, light-headedness and headaches.   Psychiatric/Behavioral:  Negative for behavioral problems. The patient is nervous/anxious.        Objective   BP (!) 146/94   Pulse 96   Ht 1.6 m (5' 3\")   Wt 72.6 kg (160 lb)   LMP  (LMP Unknown)   BMI 28.34 kg/m²     Physical Exam  HENT:      Head: Normocephalic.      Nose: Nose normal.      Mouth/Throat:      Mouth: Mucous membranes are moist.   Eyes:      Extraocular Movements: Extraocular movements intact.      Pupils: Pupils are equal, round, and reactive " to light.   Neck:      Vascular: No carotid bruit.   Cardiovascular:      Rate and Rhythm: Normal rate and regular rhythm.      Heart sounds: No murmur heard.  Pulmonary:      Effort: Pulmonary effort is normal.   Chest:   Breasts:     Right: Normal. No swelling, mass or tenderness.      Left: Normal. No swelling, mass or tenderness.   Abdominal:      General: Abdomen is flat.      Tenderness: There is no abdominal tenderness.   Genitourinary:     General: Normal vulva.      Labia:         Right: No rash.         Left: No rash.       Urethra: No prolapse or urethral pain.      Comments: Minimal atrophy.  Cervix is patent endocervical sample taken.  Bimanual exam uterus is midline no cervical motion tenderness or uterine Or adnexal tenderness noted.  Musculoskeletal:         General: No swelling.      Cervical back: Normal range of motion.      Right lower leg: No edema.      Left lower leg: No edema.   Skin:     General: Skin is warm.      Coloration: Skin is not jaundiced.   Neurological:      General: No focal deficit present.      Mental Status: She is alert.   Psychiatric:         Mood and Affect: Mood normal.         Thought Content: Thought content normal.         Assessment/Plan   Problem List Items Addressed This Visit             ICD-10-CM    Anxiety F41.9    Relevant Medications    DULoxetine (Cymbalta) 60 mg DR capsule    Hyperlipemia, mixed E78.2    Relevant Orders    Comprehensive Metabolic Panel    Lipid Panel    Hypothyroidism E03.9    Relevant Orders    TSH with reflex to Free T4 if abnormal    Impaired fasting glucose R73.01    Relevant Orders    Albumin , Urine Random    Urinalysis with Reflex Microscopic    Hemoglobin A1C     Other Visit Diagnoses         Codes    General medical exam    -  Primary Z00.00    Relevant Orders    Urinalysis with Reflex Microscopic    CBC and Auto Differential    Encounter for screening mammogram for malignant neoplasm of breast     Z12.31    Relevant Orders    BI  mammo bilateral screening tomosynthesis    Postmenopausal status     Z78.0    Relevant Orders    XR DEXA bone density axial skeleton w VFA    Need for viral immunization     Z23    Relevant Orders    Flu vaccine (IIV4) age 6 months and greater, preservative free (Completed)    Shortness of breath     R06.02    Relevant Orders    ECG 12 lead (Clinic Performed) (Completed)    Cervical smear, as part of routine gynecological examination     Z01.419    Relevant Orders    THINPREP PAP TEST

## 2023-10-29 ASSESSMENT — ENCOUNTER SYMPTOMS
HEMATURIA: 0
NUMBNESS: 1
FEVER: 0
ABDOMINAL PAIN: 0
CHEST TIGHTNESS: 0
JOINT SWELLING: 0
APPETITE CHANGE: 0
SPEECH DIFFICULTY: 0
BACK PAIN: 1
DYSURIA: 0
PALPITATIONS: 0
DIARRHEA: 0
ARTHRALGIAS: 1
MYALGIAS: 1
COLOR CHANGE: 0
ACTIVITY CHANGE: 0
FATIGUE: 1
EYE PAIN: 0
LIGHT-HEADEDNESS: 0
HEADACHES: 0
SHORTNESS OF BREATH: 0
CONSTIPATION: 0

## 2023-11-01 ENCOUNTER — LAB (OUTPATIENT)
Dept: LAB | Facility: LAB | Age: 63
End: 2023-11-01
Payer: COMMERCIAL

## 2023-11-01 DIAGNOSIS — R73.01 IMPAIRED FASTING GLUCOSE: ICD-10-CM

## 2023-11-01 DIAGNOSIS — E03.9 ACQUIRED HYPOTHYROIDISM: ICD-10-CM

## 2023-11-01 DIAGNOSIS — Z00.00 GENERAL MEDICAL EXAM: ICD-10-CM

## 2023-11-01 DIAGNOSIS — E78.2 HYPERLIPEMIA, MIXED: ICD-10-CM

## 2023-11-01 LAB
ALBUMIN SERPL-MCNC: 4.8 G/DL (ref 3.5–5)
ALP BLD-CCNC: 82 U/L (ref 35–125)
ALT SERPL-CCNC: 53 U/L (ref 5–40)
ANION GAP SERPL CALC-SCNC: 14 MMOL/L
AST SERPL-CCNC: 43 U/L (ref 5–40)
BASOPHILS # BLD AUTO: 0.04 X10*3/UL (ref 0–0.1)
BASOPHILS NFR BLD AUTO: 0.5 %
BILIRUB SERPL-MCNC: 0.6 MG/DL (ref 0.1–1.2)
BUN SERPL-MCNC: 7 MG/DL (ref 8–25)
CALCIUM SERPL-MCNC: 10 MG/DL (ref 8.5–10.4)
CHLORIDE SERPL-SCNC: 95 MMOL/L (ref 97–107)
CHOLEST SERPL-MCNC: 278 MG/DL (ref 133–200)
CHOLEST/HDLC SERPL: 5 {RATIO}
CO2 SERPL-SCNC: 26 MMOL/L (ref 24–31)
CREAT SERPL-MCNC: 0.6 MG/DL (ref 0.4–1.6)
EOSINOPHIL # BLD AUTO: 0.14 X10*3/UL (ref 0–0.7)
EOSINOPHIL NFR BLD AUTO: 1.8 %
ERYTHROCYTE [DISTWIDTH] IN BLOOD BY AUTOMATED COUNT: 11.9 % (ref 11.5–14.5)
EST. AVERAGE GLUCOSE BLD GHB EST-MCNC: 123 MG/DL
GFR SERPL CREATININE-BSD FRML MDRD: >90 ML/MIN/1.73M*2
GLUCOSE SERPL-MCNC: 126 MG/DL (ref 65–99)
HBA1C MFR BLD: 5.9 %
HCT VFR BLD AUTO: 47.2 % (ref 36–46)
HDLC SERPL-MCNC: 56 MG/DL
HGB BLD-MCNC: 16.3 G/DL (ref 12–16)
IMM GRANULOCYTES # BLD AUTO: 0.05 X10*3/UL (ref 0–0.7)
IMM GRANULOCYTES NFR BLD AUTO: 0.6 % (ref 0–0.9)
LDLC SERPL CALC-MCNC: 194 MG/DL (ref 65–130)
LYMPHOCYTES # BLD AUTO: 2.15 X10*3/UL (ref 1.2–4.8)
LYMPHOCYTES NFR BLD AUTO: 27.8 %
MCH RBC QN AUTO: 35.3 PG (ref 26–34)
MCHC RBC AUTO-ENTMCNC: 34.5 G/DL (ref 32–36)
MCV RBC AUTO: 102 FL (ref 80–100)
MONOCYTES # BLD AUTO: 0.61 X10*3/UL (ref 0.1–1)
MONOCYTES NFR BLD AUTO: 7.9 %
NEUTROPHILS # BLD AUTO: 4.75 X10*3/UL (ref 1.2–7.7)
NEUTROPHILS NFR BLD AUTO: 61.4 %
NRBC BLD-RTO: 0 /100 WBCS (ref 0–0)
PLATELET # BLD AUTO: 316 X10*3/UL (ref 150–450)
POTASSIUM SERPL-SCNC: 5.4 MMOL/L (ref 3.4–5.1)
PROT SERPL-MCNC: 7.5 G/DL (ref 5.9–7.9)
RBC # BLD AUTO: 4.62 X10*6/UL (ref 4–5.2)
SODIUM SERPL-SCNC: 135 MMOL/L (ref 133–145)
T4 FREE SERPL-MCNC: 1.2 NG/DL (ref 0.9–1.7)
TRIGL SERPL-MCNC: 138 MG/DL (ref 40–150)
TSH SERPL DL<=0.05 MIU/L-ACNC: 5.54 MIU/L (ref 0.27–4.2)
WBC # BLD AUTO: 7.7 X10*3/UL (ref 4.4–11.3)

## 2023-11-01 PROCEDURE — 83036 HEMOGLOBIN GLYCOSYLATED A1C: CPT

## 2023-11-01 PROCEDURE — 36415 COLL VENOUS BLD VENIPUNCTURE: CPT

## 2023-11-01 PROCEDURE — 80061 LIPID PANEL: CPT

## 2023-11-01 PROCEDURE — 84443 ASSAY THYROID STIM HORMONE: CPT

## 2023-11-01 PROCEDURE — 85025 COMPLETE CBC W/AUTO DIFF WBC: CPT

## 2023-11-01 PROCEDURE — 84439 ASSAY OF FREE THYROXINE: CPT

## 2023-11-01 PROCEDURE — 80053 COMPREHEN METABOLIC PANEL: CPT

## 2023-11-11 LAB
CYTOLOGY CMNT CVX/VAG CYTO-IMP: NORMAL
LAB AP HPV GENOTYPE QUESTION: YES
LAB AP HPV HR: NORMAL
LABORATORY COMMENT REPORT: NORMAL
MENSTRUAL HX REPORTED: NORMAL
PATH REPORT.TOTAL CANCER: NORMAL

## 2023-11-13 ENCOUNTER — PATIENT MESSAGE (OUTPATIENT)
Dept: PRIMARY CARE | Facility: CLINIC | Age: 63
End: 2023-11-13
Payer: COMMERCIAL

## 2023-11-27 ENCOUNTER — OFFICE VISIT (OUTPATIENT)
Dept: PRIMARY CARE | Facility: CLINIC | Age: 63
End: 2023-11-27
Payer: COMMERCIAL

## 2023-11-27 VITALS
WEIGHT: 161 LBS | SYSTOLIC BLOOD PRESSURE: 132 MMHG | DIASTOLIC BLOOD PRESSURE: 82 MMHG | OXYGEN SATURATION: 98 % | BODY MASS INDEX: 28.52 KG/M2 | HEART RATE: 99 BPM

## 2023-11-27 DIAGNOSIS — E53.8 B12 DEFICIENCY: Primary | ICD-10-CM

## 2023-11-27 DIAGNOSIS — R79.89 ELEVATED LIVER FUNCTION TESTS: ICD-10-CM

## 2023-11-27 DIAGNOSIS — E78.49 OTHER HYPERLIPIDEMIA: ICD-10-CM

## 2023-11-27 PROCEDURE — 99213 OFFICE O/P EST LOW 20 MIN: CPT | Performed by: PHYSICIAN ASSISTANT

## 2023-11-27 ASSESSMENT — ENCOUNTER SYMPTOMS
SHORTNESS OF BREATH: 0
WEAKNESS: 0
CHEST TIGHTNESS: 0
TREMORS: 1
LIGHT-HEADEDNESS: 0
PALPITATIONS: 0

## 2023-11-27 ASSESSMENT — PAIN SCALES - GENERAL: PAINLEVEL: 0-NO PAIN

## 2023-11-27 NOTE — PROGRESS NOTES
Subjective   Patient ID: Sandy Mosher is a 63 y.o. female who presents for Follow-up.    HPI   Discussed test results.  Thyroid was off a little but she had just restarted back on her medication a few days prior to her blood draw.  Potassium is up a little also.  LFTs were up but she admits she had had wine the day before.  Review of Systems   Respiratory:  Negative for chest tightness and shortness of breath.    Cardiovascular:  Negative for chest pain and palpitations.   Neurological:  Positive for tremors. Negative for weakness and light-headedness.       Objective   BP (!) 152/94   Pulse 99   Wt 73 kg (161 lb)   LMP  (LMP Unknown)   SpO2 98%   BMI 28.52 kg/m²     Physical Exam  Constitutional:       Appearance: Normal appearance.   Neurological:      General: No focal deficit present.      Mental Status: She is alert. Mental status is at baseline.   Psychiatric:         Mood and Affect: Mood normal.         Thought Content: Thought content normal.         Judgment: Judgment normal.         Assessment/Plan   Diagnoses and all orders for this visit:  B12 deficiency  -     Vitamin B12; Future  Elevated liver function tests  -     Comprehensive Metabolic Panel; Future  Other hyperlipidemia  Other orders  -     Follow Up In Primary Care - Established  Potassium was up also. Will recheck it.

## 2023-11-29 ENCOUNTER — ANCILLARY PROCEDURE (OUTPATIENT)
Dept: RADIOLOGY | Facility: CLINIC | Age: 63
End: 2023-11-29
Payer: COMMERCIAL

## 2023-11-29 VITALS — WEIGHT: 160 LBS | HEIGHT: 63 IN | BODY MASS INDEX: 28.35 KG/M2

## 2023-11-29 DIAGNOSIS — Z12.31 ENCOUNTER FOR SCREENING MAMMOGRAM FOR MALIGNANT NEOPLASM OF BREAST: ICD-10-CM

## 2023-11-29 PROCEDURE — 77063 BREAST TOMOSYNTHESIS BI: CPT

## 2024-01-10 ENCOUNTER — APPOINTMENT (OUTPATIENT)
Dept: RADIOLOGY | Facility: CLINIC | Age: 64
End: 2024-01-10
Payer: COMMERCIAL

## 2024-02-07 ENCOUNTER — APPOINTMENT (OUTPATIENT)
Dept: RADIOLOGY | Facility: CLINIC | Age: 64
End: 2024-02-07
Payer: COMMERCIAL

## 2024-03-25 ENCOUNTER — HOSPITAL ENCOUNTER (OUTPATIENT)
Dept: RADIOLOGY | Facility: CLINIC | Age: 64
Discharge: HOME | End: 2024-03-25
Payer: COMMERCIAL

## 2024-03-25 DIAGNOSIS — Z09 S/P LUNG SURGERY, FOLLOW-UP EXAM: ICD-10-CM

## 2024-03-25 PROCEDURE — 71250 CT THORAX DX C-: CPT

## 2024-03-25 PROCEDURE — 71250 CT THORAX DX C-: CPT | Performed by: RADIOLOGY

## 2024-04-09 ENCOUNTER — OFFICE VISIT (OUTPATIENT)
Dept: CARDIAC SURGERY | Facility: CLINIC | Age: 64
End: 2024-04-09
Payer: COMMERCIAL

## 2024-04-09 VITALS
OXYGEN SATURATION: 97 % | DIASTOLIC BLOOD PRESSURE: 88 MMHG | HEIGHT: 63 IN | RESPIRATION RATE: 18 BRPM | HEART RATE: 104 BPM | SYSTOLIC BLOOD PRESSURE: 144 MMHG | WEIGHT: 166 LBS | BODY MASS INDEX: 29.41 KG/M2

## 2024-04-09 DIAGNOSIS — Z09 S/P LUNG SURGERY, FOLLOW-UP EXAM: ICD-10-CM

## 2024-04-09 DIAGNOSIS — C34.11 MALIGNANT NEOPLASM OF UPPER LOBE OF RIGHT LUNG (MULTI): Primary | ICD-10-CM

## 2024-04-09 PROCEDURE — 1036F TOBACCO NON-USER: CPT | Performed by: THORACIC SURGERY (CARDIOTHORACIC VASCULAR SURGERY)

## 2024-04-09 PROCEDURE — 99215 OFFICE O/P EST HI 40 MIN: CPT | Performed by: THORACIC SURGERY (CARDIOTHORACIC VASCULAR SURGERY)

## 2024-04-09 ASSESSMENT — ENCOUNTER SYMPTOMS
PSYCHIATRIC NEGATIVE: 1
STRIDOR: 0
VOMITING: 0
FEVER: 0
HEMATOLOGIC/LYMPHATIC NEGATIVE: 1
ENDOCRINE NEGATIVE: 1
ABDOMINAL DISTENTION: 0
CHILLS: 0
APPETITE CHANGE: 0
MUSCULOSKELETAL NEGATIVE: 1
ALLERGIC/IMMUNOLOGIC NEGATIVE: 1
PALPITATIONS: 0
CONSTIPATION: 0
ABDOMINAL PAIN: 0
EYES NEGATIVE: 1
UNEXPECTED WEIGHT CHANGE: 0
NEUROLOGICAL NEGATIVE: 1
SHORTNESS OF BREATH: 0
FATIGUE: 0
DIARRHEA: 0
COUGH: 0
LOSS OF SENSATION IN FEET: 0
DEPRESSION: 0
CHOKING: 0
WHEEZING: 0
CHEST TIGHTNESS: 0
DIAPHORESIS: 0
OCCASIONAL FEELINGS OF UNSTEADINESS: 0
NAUSEA: 0

## 2024-04-09 ASSESSMENT — LIFESTYLE VARIABLES
HOW OFTEN DO YOU HAVE A DRINK CONTAINING ALCOHOL: NEVER
HOW OFTEN DO YOU HAVE SIX OR MORE DRINKS ON ONE OCCASION: NEVER
AUDIT-C TOTAL SCORE: 0
SKIP TO QUESTIONS 9-10: 1
HOW MANY STANDARD DRINKS CONTAINING ALCOHOL DO YOU HAVE ON A TYPICAL DAY: PATIENT DOES NOT DRINK

## 2024-04-09 ASSESSMENT — PAIN SCALES - GENERAL: PAINLEVEL: 0-NO PAIN

## 2024-04-09 NOTE — PROGRESS NOTES
Subjective   Patient ID: Sandy Mosher is a 63 y.o. female who presents for Follow-up (1 year w CT).  HPI  60-year-old female who last March underwent a robotic assisted upper lobe wedge resection completion upper lobectomy and mediastinal lymphadenectomy for lung cancer.  She has recovered very well from it.  Today she is here for follow-up.  I have personally reviewed her CAT scan and there is no evidence of recurrence.  She has recovered very well with minimal discomfort in the right upper quadrant of the abdomen.  I will see again in 6 months with a CT of the chest.    Update 4/9/2024  She has been doing well exercising more.  She quit smoking and she is very happy about it.  Her CT scan today shows no evidence of recurrence.  We discussed continue exercise and smoking cessation.  I will see her again in 6 months with a CT of the chest.    Review of Systems   Constitutional:  Negative for appetite change, chills, diaphoresis, fatigue, fever and unexpected weight change.   HENT: Negative.     Eyes: Negative.    Respiratory:  Negative for cough, choking, chest tightness, shortness of breath, wheezing and stridor.    Cardiovascular:  Negative for chest pain, palpitations and leg swelling.   Gastrointestinal:  Negative for abdominal distention, abdominal pain, constipation, diarrhea, nausea and vomiting.   Endocrine: Negative.    Genitourinary: Negative.    Musculoskeletal: Negative.    Skin: Negative.    Allergic/Immunologic: Negative.    Neurological: Negative.    Hematological: Negative.    Psychiatric/Behavioral: Negative.     All other systems reviewed and are negative.      Objective   Physical Exam  Constitutional:       Appearance: Normal appearance.   HENT:      Head: Normocephalic and atraumatic.      Nose: Nose normal.      Mouth/Throat:      Mouth: Mucous membranes are moist.      Pharynx: Oropharynx is clear.   Eyes:      Extraocular Movements: Extraocular movements intact.      Conjunctiva/sclera:  Conjunctivae normal.      Pupils: Pupils are equal, round, and reactive to light.   Cardiovascular:      Rate and Rhythm: Normal rate and regular rhythm.      Pulses: Normal pulses.      Heart sounds: Normal heart sounds.   Pulmonary:      Effort: Pulmonary effort is normal. No respiratory distress.      Breath sounds: Normal breath sounds. No stridor. No wheezing, rhonchi or rales.   Chest:      Chest wall: No tenderness.   Abdominal:      General: Abdomen is flat. Bowel sounds are normal.      Palpations: Abdomen is soft.   Musculoskeletal:         General: Normal range of motion.      Cervical back: Normal range of motion and neck supple.   Skin:     General: Skin is warm and dry.      Capillary Refill: Capillary refill takes less than 2 seconds.   Neurological:      General: No focal deficit present.      Mental Status: She is alert and oriented to person, place, and time.         Assessment/Plan   Diagnoses and all orders for this visit:  Malignant neoplasm of upper lobe of right lung (CMS/HCC)  Follow-up in 6 months with a CT of the chest.    Sahil Kim MD  Thoracic and Esophageal Surgery

## 2024-10-03 ENCOUNTER — HOSPITAL ENCOUNTER (OUTPATIENT)
Dept: RADIOLOGY | Facility: CLINIC | Age: 64
Discharge: HOME | End: 2024-10-03
Payer: COMMERCIAL

## 2024-10-03 DIAGNOSIS — Z09 S/P LUNG SURGERY, FOLLOW-UP EXAM: ICD-10-CM

## 2024-10-03 PROCEDURE — 71250 CT THORAX DX C-: CPT

## 2024-10-07 ENCOUNTER — OFFICE VISIT (OUTPATIENT)
Dept: CARDIAC SURGERY | Facility: CLINIC | Age: 64
End: 2024-10-07
Payer: COMMERCIAL

## 2024-10-07 VITALS
RESPIRATION RATE: 18 BRPM | WEIGHT: 169.38 LBS | OXYGEN SATURATION: 97 % | BODY MASS INDEX: 30.01 KG/M2 | DIASTOLIC BLOOD PRESSURE: 82 MMHG | HEIGHT: 63 IN | HEART RATE: 94 BPM | SYSTOLIC BLOOD PRESSURE: 151 MMHG

## 2024-10-07 DIAGNOSIS — C34.11 MALIGNANT NEOPLASM OF UPPER LOBE OF RIGHT LUNG (MULTI): Primary | ICD-10-CM

## 2024-10-07 DIAGNOSIS — Z98.890 HISTORY OF LUNG SURGERY: ICD-10-CM

## 2024-10-07 PROCEDURE — 1036F TOBACCO NON-USER: CPT | Performed by: THORACIC SURGERY (CARDIOTHORACIC VASCULAR SURGERY)

## 2024-10-07 PROCEDURE — 99215 OFFICE O/P EST HI 40 MIN: CPT | Performed by: THORACIC SURGERY (CARDIOTHORACIC VASCULAR SURGERY)

## 2024-10-07 PROCEDURE — 3008F BODY MASS INDEX DOCD: CPT | Performed by: THORACIC SURGERY (CARDIOTHORACIC VASCULAR SURGERY)

## 2024-10-07 ASSESSMENT — ENCOUNTER SYMPTOMS
CHOKING: 0
UNEXPECTED WEIGHT CHANGE: 0
FATIGUE: 0
ENDOCRINE NEGATIVE: 1
DEPRESSION: 0
ABDOMINAL DISTENTION: 0
COUGH: 0
HEMATOLOGIC/LYMPHATIC NEGATIVE: 1
LOSS OF SENSATION IN FEET: 0
OCCASIONAL FEELINGS OF UNSTEADINESS: 0
ALLERGIC/IMMUNOLOGIC NEGATIVE: 1
VOMITING: 0
SHORTNESS OF BREATH: 0
DIAPHORESIS: 0
PSYCHIATRIC NEGATIVE: 1
WHEEZING: 0
APPETITE CHANGE: 0
MUSCULOSKELETAL NEGATIVE: 1
ABDOMINAL PAIN: 0
CHEST TIGHTNESS: 0
NAUSEA: 0
FEVER: 0
EYES NEGATIVE: 1
PALPITATIONS: 0
CONSTIPATION: 0
NEUROLOGICAL NEGATIVE: 1
DIARRHEA: 0
STRIDOR: 0
CHILLS: 0

## 2024-10-07 ASSESSMENT — PAIN SCALES - GENERAL: PAINLEVEL: 0-NO PAIN

## 2024-10-07 ASSESSMENT — LIFESTYLE VARIABLES
HOW OFTEN DO YOU HAVE SIX OR MORE DRINKS ON ONE OCCASION: NEVER
HOW OFTEN DO YOU HAVE A DRINK CONTAINING ALCOHOL: 4 OR MORE TIMES A WEEK
SKIP TO QUESTIONS 9-10: 1
AUDIT-C TOTAL SCORE: 4
HOW MANY STANDARD DRINKS CONTAINING ALCOHOL DO YOU HAVE ON A TYPICAL DAY: 1 OR 2

## 2024-10-07 NOTE — PROGRESS NOTES
Subjective   Patient ID: Sandy Mosher is a 63 y.o. female who presents for Follow-up (1 yr w ct).  HPI  60-year-old female who last March underwent a robotic assisted upper lobe wedge resection completion upper lobectomy and mediastinal lymphadenectomy for lung cancer.  She has recovered very well from it.  Today she is here for follow-up.  I have personally reviewed her CAT scan and there is no evidence of recurrence.  She has recovered very well with minimal discomfort in the right upper quadrant of the abdomen.  I will see again in 6 months with a CT of the chest.    Update 4/9/2024  She has been doing well exercising more.  She quit smoking and she is very happy about it.  Her CT scan today shows no evidence of recurrence.  We discussed continue exercise and smoking cessation.  I will see her again in 6 months with a CT of the chest.    Update 10/7/2024  Not smoking.  Doing well.  No new issues in the last 6 months.  CT scan showing no evidence of recurrence.  Will see her again in 6 months with a CT of the chest.  We are almost at the 2-year xu.    Review of Systems   Constitutional:  Negative for appetite change, chills, diaphoresis, fatigue, fever and unexpected weight change.   HENT: Negative.     Eyes: Negative.    Respiratory:  Negative for cough, choking, chest tightness, shortness of breath, wheezing and stridor.    Cardiovascular:  Negative for chest pain, palpitations and leg swelling.   Gastrointestinal:  Negative for abdominal distention, abdominal pain, constipation, diarrhea, nausea and vomiting.   Endocrine: Negative.    Genitourinary: Negative.    Musculoskeletal: Negative.    Skin: Negative.    Allergic/Immunologic: Negative.    Neurological: Negative.    Hematological: Negative.    Psychiatric/Behavioral: Negative.     All other systems reviewed and are negative.      Objective   Physical Exam  Constitutional:       Appearance: Normal appearance.   HENT:      Head: Normocephalic and  atraumatic.      Nose: Nose normal.      Mouth/Throat:      Mouth: Mucous membranes are moist.      Pharynx: Oropharynx is clear.   Eyes:      Extraocular Movements: Extraocular movements intact.      Conjunctiva/sclera: Conjunctivae normal.      Pupils: Pupils are equal, round, and reactive to light.   Cardiovascular:      Rate and Rhythm: Normal rate and regular rhythm.      Pulses: Normal pulses.      Heart sounds: Normal heart sounds.   Pulmonary:      Effort: Pulmonary effort is normal. No respiratory distress.      Breath sounds: Normal breath sounds. No stridor. No wheezing, rhonchi or rales.   Chest:      Chest wall: No tenderness.   Abdominal:      General: Abdomen is flat. Bowel sounds are normal.      Palpations: Abdomen is soft.   Musculoskeletal:         General: Normal range of motion.      Cervical back: Normal range of motion and neck supple.   Skin:     General: Skin is warm and dry.      Capillary Refill: Capillary refill takes less than 2 seconds.   Neurological:      General: No focal deficit present.      Mental Status: She is alert and oriented to person, place, and time.         Assessment/Plan   Diagnoses and all orders for this visit:  Malignant neoplasm of upper lobe of right lung (Multi)    Follow-up in 6 months with a CT of the chest.    Sahil Kim MD  Thoracic and Esophageal Surgery

## 2025-01-13 ENCOUNTER — APPOINTMENT (OUTPATIENT)
Dept: PRIMARY CARE | Facility: CLINIC | Age: 65
End: 2025-01-13
Payer: COMMERCIAL

## 2025-01-13 VITALS
DIASTOLIC BLOOD PRESSURE: 84 MMHG | HEART RATE: 80 BPM | BODY MASS INDEX: 30.11 KG/M2 | SYSTOLIC BLOOD PRESSURE: 136 MMHG | WEIGHT: 170 LBS | OXYGEN SATURATION: 99 %

## 2025-01-13 DIAGNOSIS — E55.9 VITAMIN D DEFICIENCY: ICD-10-CM

## 2025-01-13 DIAGNOSIS — E03.9 ACQUIRED HYPOTHYROIDISM: ICD-10-CM

## 2025-01-13 DIAGNOSIS — R79.89 ELEVATED LIVER FUNCTION TESTS: ICD-10-CM

## 2025-01-13 DIAGNOSIS — E78.2 HYPERLIPEMIA, MIXED: ICD-10-CM

## 2025-01-13 DIAGNOSIS — Z78.0 POST-MENOPAUSAL: ICD-10-CM

## 2025-01-13 DIAGNOSIS — Z12.31 ENCOUNTER FOR SCREENING MAMMOGRAM FOR BREAST CANCER: ICD-10-CM

## 2025-01-13 DIAGNOSIS — R73.01 IMPAIRED FASTING GLUCOSE: ICD-10-CM

## 2025-01-13 DIAGNOSIS — E53.8 B12 DEFICIENCY: ICD-10-CM

## 2025-01-13 DIAGNOSIS — Z00.00 GENERAL MEDICAL EXAM: Primary | ICD-10-CM

## 2025-01-13 DIAGNOSIS — F41.9 ANXIETY: ICD-10-CM

## 2025-01-13 LAB
POC APPEARANCE, URINE: CLEAR
POC BILIRUBIN, URINE: NEGATIVE
POC BLOOD, URINE: NEGATIVE
POC COLOR, URINE: YELLOW
POC GLUCOSE, URINE: NEGATIVE MG/DL
POC KETONES, URINE: NEGATIVE MG/DL
POC LEUKOCYTES, URINE: NEGATIVE
POC NITRITE,URINE: NEGATIVE
POC PH, URINE: 5 PH
POC PROTEIN, URINE: NEGATIVE MG/DL
POC SPECIFIC GRAVITY, URINE: >=1.03
POC UROBILINOGEN, URINE: 0.2 EU/DL

## 2025-01-13 PROCEDURE — 81003 URINALYSIS AUTO W/O SCOPE: CPT | Performed by: PHYSICIAN ASSISTANT

## 2025-01-13 PROCEDURE — 99396 PREV VISIT EST AGE 40-64: CPT | Performed by: PHYSICIAN ASSISTANT

## 2025-01-13 PROCEDURE — 1036F TOBACCO NON-USER: CPT | Performed by: PHYSICIAN ASSISTANT

## 2025-01-13 RX ORDER — DULOXETIN HYDROCHLORIDE 20 MG/1
40 CAPSULE, DELAYED RELEASE ORAL DAILY
Qty: 60 CAPSULE | Refills: 5 | Status: SHIPPED | OUTPATIENT
Start: 2025-01-13 | End: 2025-07-12

## 2025-01-13 ASSESSMENT — PATIENT HEALTH QUESTIONNAIRE - PHQ9
2. FEELING DOWN, DEPRESSED OR HOPELESS: NOT AT ALL
1. LITTLE INTEREST OR PLEASURE IN DOING THINGS: NOT AT ALL
SUM OF ALL RESPONSES TO PHQ9 QUESTIONS 1 AND 2: 0

## 2025-01-13 ASSESSMENT — ENCOUNTER SYMPTOMS
PALPITATIONS: 0
ACTIVITY CHANGE: 0
DIZZINESS: 0
SHORTNESS OF BREATH: 0
NAUSEA: 0
BLOOD IN STOOL: 0
COLOR CHANGE: 0
LIGHT-HEADEDNESS: 0
CONSTIPATION: 0
WEAKNESS: 1
MYALGIAS: 1
APPETITE CHANGE: 0
SLEEP DISTURBANCE: 0
JOINT SWELLING: 0
TREMORS: 0
CHEST TIGHTNESS: 0
ARTHRALGIAS: 1
FREQUENCY: 0
NERVOUS/ANXIOUS: 0
DIARRHEA: 0
WHEEZING: 0
ABDOMINAL PAIN: 0

## 2025-01-13 ASSESSMENT — PAIN SCALES - GENERAL: PAINLEVEL_OUTOF10: 0-NO PAIN

## 2025-01-13 NOTE — PROGRESS NOTES
Subjective   Patient ID: Sandy Mosher is a 64 y.o. female who presents for Annual Exam.    Hyperlipidemia  This is a chronic problem. The current episode started more than 1 year ago. The problem is uncontrolled. Associated symptoms include myalgias. Pertinent negatives include no chest pain or shortness of breath. She is currently on no antihyperlipidemic treatment. Compliance problems include adherence to diet.  Risk factors for coronary artery disease include dyslipidemia.      Ran out of Cymbalta a while ago.  Watches her grandkids a few days a week.  Has not really been doing much.  Is very much aware of her weight gain.  Review of Systems   Constitutional:  Negative for activity change and appetite change.   HENT:  Negative for dental problem.    Eyes:  Negative for visual disturbance.   Respiratory:  Negative for chest tightness, shortness of breath and wheezing.    Cardiovascular:  Negative for chest pain, palpitations and leg swelling.   Gastrointestinal:  Negative for abdominal pain, blood in stool, constipation, diarrhea and nausea.   Endocrine: Negative for cold intolerance and heat intolerance.   Genitourinary:  Negative for frequency and urgency.   Musculoskeletal:  Positive for arthralgias and myalgias. Negative for joint swelling.   Skin:  Negative for color change.   Allergic/Immunologic: Negative for immunocompromised state.   Neurological:  Positive for weakness. Negative for dizziness, tremors and light-headedness.   Psychiatric/Behavioral:  Negative for behavioral problems and sleep disturbance. The patient is not nervous/anxious.        Objective   /84   Pulse 80   Wt 77.1 kg (170 lb)   LMP  (LMP Unknown)   SpO2 99%   BMI 30.11 kg/m²     Physical Exam  HENT:      Right Ear: Tympanic membrane normal.      Left Ear: Tympanic membrane normal.      Nose: Nose normal.      Mouth/Throat:      Mouth: Mucous membranes are moist.   Eyes:      Extraocular Movements: Extraocular movements  intact.      Pupils: Pupils are equal, round, and reactive to light.   Neck:      Thyroid: No thyromegaly.      Vascular: No carotid bruit.   Cardiovascular:      Rate and Rhythm: Normal rate and regular rhythm.      Pulses: Normal pulses.   Pulmonary:      Effort: Pulmonary effort is normal. No respiratory distress.   Chest:   Breasts:     Right: No swelling, bleeding or inverted nipple.      Left: No swelling, bleeding or inverted nipple.   Abdominal:      General: Bowel sounds are normal.      Tenderness: There is no abdominal tenderness.   Musculoskeletal:      Cervical back: Normal range of motion. No tenderness.      Right lower leg: No edema.      Left lower leg: No edema.   Skin:     General: Skin is warm.   Neurological:      General: No focal deficit present.      Mental Status: She is alert. Mental status is at baseline.   Psychiatric:         Mood and Affect: Mood normal.         Thought Content: Thought content normal.         Judgment: Judgment normal.         Assessment/Plan   Diagnoses and all orders for this visit:  General medical exam  -     CBC and Auto Differential; Future  -     Comprehensive Metabolic Panel; Future  -     Lipid Panel; Future  -     TSH with reflex to Free T4 if abnormal; Future  -     Hepatitis C antibody; Future  -     POCT UA Automated manually resulted  Hyperlipemia, mixed  -     Comprehensive Metabolic Panel; Future  -     Hemoglobin A1C; Future  -     Lipid Panel; Future  B12 deficiency  -     Vitamin B12; Future  Elevated liver function tests  -     CBC and Auto Differential; Future  -     Comprehensive Metabolic Panel; Future  -     Lipid Panel; Future  -     Hepatitis C antibody; Future  Acquired hypothyroidism  -     TSH with reflex to Free T4 if abnormal; Future  Impaired fasting glucose  -     Hemoglobin A1C; Future  -     Lipid Panel; Future  -     POCT UA Automated manually resulted  Vitamin D deficiency  -     Vitamin D 25-Hydroxy,Total (for eval of Vitamin D  levels); Future  Encounter for screening mammogram for breast cancer  -     BI mammo bilateral screening tomosynthesis; Future  Post-menopausal  -     XR DEXA bone density axial skeleton w VFA; Future  Anxiety  -     DULoxetine (Cymbalta) 20 mg DR capsule; Take 2 capsules (40 mg) by mouth once daily. Do not crush or chew.  Other orders  -     Follow Up In Primary Care - Established; Future    Discussed the importance of taking her vitamins vitamin D especially.  Will get her back on Cymbalta to help with her mood and chronic pain.  Will start back at 40 mg a day.  Will follow-up in 3 months.  Risk and benefits of medication discussed.  Discussed compliance as well.

## 2025-04-11 ENCOUNTER — HOSPITAL ENCOUNTER (OUTPATIENT)
Dept: RADIOLOGY | Facility: CLINIC | Age: 65
Discharge: HOME | End: 2025-04-11
Payer: COMMERCIAL

## 2025-04-11 DIAGNOSIS — Z98.890 HISTORY OF LUNG SURGERY: ICD-10-CM

## 2025-04-11 PROCEDURE — 71250 CT THORAX DX C-: CPT

## 2025-04-14 ENCOUNTER — APPOINTMENT (OUTPATIENT)
Dept: PRIMARY CARE | Facility: CLINIC | Age: 65
End: 2025-04-14
Payer: COMMERCIAL

## 2025-04-15 ENCOUNTER — OFFICE VISIT (OUTPATIENT)
Facility: CLINIC | Age: 65
End: 2025-04-15
Payer: COMMERCIAL

## 2025-04-15 VITALS
RESPIRATION RATE: 18 BRPM | HEART RATE: 110 BPM | HEIGHT: 63 IN | WEIGHT: 174 LBS | SYSTOLIC BLOOD PRESSURE: 150 MMHG | DIASTOLIC BLOOD PRESSURE: 96 MMHG | BODY MASS INDEX: 30.83 KG/M2 | OXYGEN SATURATION: 96 %

## 2025-04-15 DIAGNOSIS — R91.8 LUNG NODULES: Primary | ICD-10-CM

## 2025-04-15 PROCEDURE — 1036F TOBACCO NON-USER: CPT | Performed by: THORACIC SURGERY (CARDIOTHORACIC VASCULAR SURGERY)

## 2025-04-15 PROCEDURE — 99215 OFFICE O/P EST HI 40 MIN: CPT | Performed by: THORACIC SURGERY (CARDIOTHORACIC VASCULAR SURGERY)

## 2025-04-15 PROCEDURE — 3008F BODY MASS INDEX DOCD: CPT | Performed by: THORACIC SURGERY (CARDIOTHORACIC VASCULAR SURGERY)

## 2025-04-15 ASSESSMENT — ENCOUNTER SYMPTOMS
MUSCULOSKELETAL NEGATIVE: 1
HEMATOLOGIC/LYMPHATIC NEGATIVE: 1
FEVER: 0
DIAPHORESIS: 0
ABDOMINAL DISTENTION: 0
ENDOCRINE NEGATIVE: 1
EYES NEGATIVE: 1
CHOKING: 0
VOMITING: 0
NEUROLOGICAL NEGATIVE: 1
PSYCHIATRIC NEGATIVE: 1
STRIDOR: 0
NAUSEA: 0
WHEEZING: 0
LOSS OF SENSATION IN FEET: 0
APPETITE CHANGE: 0
SHORTNESS OF BREATH: 0
COUGH: 0
FATIGUE: 0
CONSTIPATION: 0
OCCASIONAL FEELINGS OF UNSTEADINESS: 0
UNEXPECTED WEIGHT CHANGE: 0
DEPRESSION: 0
ALLERGIC/IMMUNOLOGIC NEGATIVE: 1
ABDOMINAL PAIN: 0
CHILLS: 0
CHEST TIGHTNESS: 0
DIARRHEA: 0
PALPITATIONS: 0

## 2025-04-15 ASSESSMENT — LIFESTYLE VARIABLES
HOW MANY STANDARD DRINKS CONTAINING ALCOHOL DO YOU HAVE ON A TYPICAL DAY: 1 OR 2
SKIP TO QUESTIONS 9-10: 1
AUDIT-C TOTAL SCORE: 3
HOW OFTEN DO YOU HAVE A DRINK CONTAINING ALCOHOL: 2-3 TIMES A WEEK
HOW OFTEN DO YOU HAVE SIX OR MORE DRINKS ON ONE OCCASION: NEVER

## 2025-04-15 ASSESSMENT — PAIN SCALES - GENERAL: PAINLEVEL_OUTOF10: 0-NO PAIN

## 2025-04-15 ASSESSMENT — PATIENT HEALTH QUESTIONNAIRE - PHQ9
2. FEELING DOWN, DEPRESSED OR HOPELESS: NOT AT ALL
SUM OF ALL RESPONSES TO PHQ9 QUESTIONS 1 AND 2: 0
1. LITTLE INTEREST OR PLEASURE IN DOING THINGS: NOT AT ALL

## 2025-04-15 NOTE — PROGRESS NOTES
Subjective   Patient ID: Sandy Mosher is a 64 y.o. female who presents for Follow-up (6 mo w ct).  HPI  60-year-old female who last March underwent a robotic assisted upper lobe wedge resection completion upper lobectomy and mediastinal lymphadenectomy for lung cancer.  She has recovered very well from it.  Today she is here for follow-up.  I have personally reviewed her CAT scan and there is no evidence of recurrence.  She has recovered very well with minimal discomfort in the right upper quadrant of the abdomen.  I will see again in 6 months with a CT of the chest.    Update 4/9/2024  She has been doing well exercising more.  She quit smoking and she is very happy about it.  Her CT scan today shows no evidence of recurrence.  We discussed continue exercise and smoking cessation.  I will see her again in 6 months with a CT of the chest.    Update 10/7/2024  Not smoking.  Doing well.  No new issues in the last 6 months.  CT scan showing no evidence of recurrence.  Will see her again in 6 months with a CT of the chest.  We are almost at the 2-year xu.    Update 4/15/2025  She has been doing well no issues.  CT scan does not show any evidence of recurrence.  We have reached the 2-year xu so we get to move to CT scan every year.  I will see her in 1 year with CT of the chest.    Review of Systems   Constitutional:  Negative for appetite change, chills, diaphoresis, fatigue, fever and unexpected weight change.   HENT: Negative.     Eyes: Negative.    Respiratory:  Negative for cough, choking, chest tightness, shortness of breath, wheezing and stridor.    Cardiovascular:  Negative for chest pain, palpitations and leg swelling.   Gastrointestinal:  Negative for abdominal distention, abdominal pain, constipation, diarrhea, nausea and vomiting.   Endocrine: Negative.    Genitourinary: Negative.    Musculoskeletal: Negative.    Skin: Negative.    Allergic/Immunologic: Negative.    Neurological: Negative.     Hematological: Negative.    Psychiatric/Behavioral: Negative.     All other systems reviewed and are negative.      Objective   Physical Exam  Constitutional:       Appearance: Normal appearance.   HENT:      Head: Normocephalic and atraumatic.      Nose: Nose normal.      Mouth/Throat:      Mouth: Mucous membranes are moist.      Pharynx: Oropharynx is clear.   Eyes:      Extraocular Movements: Extraocular movements intact.      Conjunctiva/sclera: Conjunctivae normal.      Pupils: Pupils are equal, round, and reactive to light.   Cardiovascular:      Rate and Rhythm: Normal rate and regular rhythm.      Pulses: Normal pulses.      Heart sounds: Normal heart sounds.   Pulmonary:      Effort: Pulmonary effort is normal. No respiratory distress.      Breath sounds: Normal breath sounds. No stridor. No wheezing, rhonchi or rales.   Chest:      Chest wall: No tenderness.   Abdominal:      General: Abdomen is flat. Bowel sounds are normal.      Palpations: Abdomen is soft.   Musculoskeletal:         General: Normal range of motion.      Cervical back: Normal range of motion and neck supple.   Skin:     General: Skin is warm and dry.      Capillary Refill: Capillary refill takes less than 2 seconds.   Neurological:      General: No focal deficit present.      Mental Status: She is alert and oriented to person, place, and time.         Assessment/Plan   Diagnoses and all orders for this visit:  Lung nodules      Follow-up in 12 months with a CT of the chest.    Sahil Kim MD  Thoracic and Esophageal Surgery

## 2025-04-16 DIAGNOSIS — Z98.890 HISTORY OF LUNG SURGERY: ICD-10-CM
